# Patient Record
Sex: FEMALE | Race: WHITE
[De-identification: names, ages, dates, MRNs, and addresses within clinical notes are randomized per-mention and may not be internally consistent; named-entity substitution may affect disease eponyms.]

---

## 2018-04-19 ENCOUNTER — HOSPITAL ENCOUNTER (EMERGENCY)
Dept: HOSPITAL 25 - UCCORT | Age: 63
Discharge: HOME | End: 2018-04-19
Payer: COMMERCIAL

## 2018-04-19 VITALS — DIASTOLIC BLOOD PRESSURE: 63 MMHG | SYSTOLIC BLOOD PRESSURE: 145 MMHG

## 2018-04-19 DIAGNOSIS — J32.9: Primary | ICD-10-CM

## 2018-04-19 DIAGNOSIS — R42: ICD-10-CM

## 2018-04-19 PROCEDURE — 99202 OFFICE O/P NEW SF 15 MIN: CPT

## 2018-04-19 PROCEDURE — G0463 HOSPITAL OUTPT CLINIC VISIT: HCPCS

## 2018-04-19 NOTE — UC
Throat Pain/Nasal Andrea HPI





- HPI Summary


HPI Summary: 





sinus pain and pressure x 2 weeks


+ nasal congestion , pnd, no cough , no fever


+ dizziness started this morning , worse with head movement 


no nausea , no vomiting, no headache 





- History of Current Complaint


Chief Complaint: UCEar


Stated Complaint: SINUSES,EARS


Time Seen by Provider: 18 12:55


Hx Obtained From: Patient


Onset/Duration: Gradual Onset, Lasting Weeks - 2, Still Present


Severity: Moderate


Pain Intensity: 2


Cough: None


Associated Signs & Symptoms: Positive: Sinus Discomfort, Nasal Discharge.  

Negative: Dysphagia, FB Sensation, Drooling, Hoarseness, Fever, Vomiting, Rash





- Allergies/Home Medications


Allergies/Adverse Reactions: 


 Allergies











Allergy/AdvReac Type Severity Reaction Status Date / Time


 


No Known Allergies Allergy   Verified 18 12:50











Home Medications: 


 Home Medications





Naproxen Sodium [Aleve] 440 mg PO DAILY PRN 18 [History Confirmed 18

]











PMH/Surg Hx/FS Hx/Imm Hx


Previously Healthy: Yes





- Surgical History


Surgical History: Yes


Surgery Procedure, Year, and Place: .  choly.  ACL repair.  tonsils





- Family History


Known Family History: 


   Negative: Diabetes





- Social History


Alcohol Use: Occasionally


Substance Use Type: None


Smoking Status (MU): Never Smoked Tobacco





Review of Systems


Constitutional: Negative


Skin: Negative


Eyes: Negative


ENT: Ear Ache, Nasal Discharge, Sinus Congestion, Sinus Pain/Tenderness


Respiratory: Negative


Cardiovascular: Negative


Gastrointestinal: Negative


Neurological: Negative


Is Patient Immunocompromised?: No


All Other Systems Reviewed And Are Negative: Yes





Physical Exam


Triage Information Reviewed: Yes


Appearance: Well-Appearing, No Pain Distress, Well-Nourished


Vital Signs: 


 Initial Vital Signs











Temp  98.4 F   18 12:41


 


Pulse  73   18 12:41


 


Resp  18   18 12:41


 


BP  145/63   18 12:41


 


Pulse Ox  99   18 12:41











Vital Signs Reviewed: Yes


Eyes: Positive: Conjunctiva Clear


ENT: Positive: Normal ENT inspection, Hearing grossly normal, Pharynx normal, 

Nasal congestion, TMs normal.  Negative: TM bulging, TM dull, TM red


Neck exam: Normal


Neck: Positive: Supple, Nontender, No Lymphadenopathy


Respiratory: Positive: Chest non-tender, Lungs clear, Normal breath sounds, No 

respiratory distress


Cardiovascular: Positive: RRR, No Murmur, Pulses Normal


Musculoskeletal Exam: Normal


Neurological Exam: Normal


Neurological: Positive: Alert, Muscle Tone Normal.  Negative: Fatigued, 

Lethargic, Unresponsive, Abnormal Muscle Tone


Skin Exam: Normal





Throat Pain/Nasal Course/Dx





- Differential Dx/Diagnosis


Provider Diagnoses: sinusitis.  vertigo





Discharge





- Sign-Out/Discharge


Documenting (check all that apply): Discharge





- Discharge Plan


Condition: Stable


Disposition: HOME


Prescriptions: 


Amoxicillin/Clavulanate TAB* [Augmentin *] 875 mg PO BID #20 tab


Fluticasone NASAL SPRAY 50MCG* [Flonase NASAL SPRAY 50MCG*] 2 spray BOTH NARES 

DAILY #1 btl


Meclizine TAB* [Antivert 12.5 TAB*] 25 mg PO TID PRN #15 tab


 PRN Reason: Vertigo


Patient Education Materials:  Sinusitis (ED), Vertigo (DC)


Referrals: 


Dior Perez MD [Primary Care Provider] - 7 Days





- Billing Disposition and Condition


Condition: STABLE


Disposition: HOME

## 2019-01-17 ENCOUNTER — HOSPITAL ENCOUNTER (EMERGENCY)
Dept: HOSPITAL 25 - UCCORT | Age: 64
Discharge: HOME | End: 2019-01-17
Payer: COMMERCIAL

## 2019-01-17 VITALS — DIASTOLIC BLOOD PRESSURE: 77 MMHG | SYSTOLIC BLOOD PRESSURE: 139 MMHG

## 2019-01-17 DIAGNOSIS — J09.X2: Primary | ICD-10-CM

## 2019-01-17 PROCEDURE — 99212 OFFICE O/P EST SF 10 MIN: CPT

## 2019-01-17 PROCEDURE — G0463 HOSPITAL OUTPT CLINIC VISIT: HCPCS

## 2019-01-17 NOTE — UC
FLU HPI





- HPI Summary


HPI Summary: 





62 y/o female presents to the urgent care c/o headache, fatigue, body aches, 

clear nasal discharge, decrease appetite x 3 days. Pain is 4/10. Pt has been 

taking Tylenol PO to alleviate symptoms. He has a lot of PND and low grade 

fever at home which has been resolving. Pt denies SOB, chest pain,abdominal pain

, chest pain, N/V/D. 








- History of Current Complaint


Chief Complaint: UCGeneralIllness


Stated Complaint: FATIGUE, HEADACHE, APPETITE LOSS


Time Seen by Provider: 19 16:18


Hx Obtained From: Patient


Onset/Duration: Gradual Onset, Lasting Days - 3 days, Still Present, Worse 

Since - today


Severity Currently: Mild


Severity Initially: Mild


Pain Intensity: 4 - Headache


Pain Scale Used: 0-10 Numeric


Associated Signs & Symptoms: Positive: Fever - subjective fever at home, Myalgia

, Cough - dry, Nasal Congestion - clear, Headache





- Risk Factors


Influenza Risk Factors: Negative





- Allergy/Home Medications


Allergies/Adverse Reactions: 


 Allergies











Allergy/AdvReac Type Severity Reaction Status Date / Time


 


No Known Allergies Allergy   Verified 19 15:54











Home Medications: 


 Home Medications





Acetaminophen TAB* [Tylenol TAB*] 325 mg PO Q4H PRN 19 [History Confirmed 

19]











PMH/Surg Hx/FS Hx/Imm Hx


Previously Healthy: Yes - Pt denies PMHX





- Surgical History


Surgical History: Yes


Surgery Procedure, Year, and Place:  x2.  choly.  ACL repair.  tonsils





- Family History


Known Family History: Positive: Diabetes





- Social History


Occupation: Employed Full-time


Lives: With Family


Alcohol Use: Occasionally


Substance Use Type: None


Smoking Status (MU): Never Smoked Tobacco





Review of Systems


All Other Systems Reviewed And Are Negative: Yes


Constitutional: Positive: Fatigue, Other - body aches


Skin: Positive: Negative


Eyes: Positive: Negative


ENT: Positive: Nasal Discharge, Sinus Congestion


Respiratory: Positive: Cough - dry


Cardiovascular: Positive: Negative


Gastrointestinal: Positive: Negative


Genitourinary: Positive: Negative


Motor: Positive: Negative


Neurovascular: Positive: Negative


Musculoskeletal: Positive: Myalgia


Neurological: Positive: Headache


Psychological: Positive: Negative


Is Patient Immunocompromised?: No





Physical Exam





- Summary


Physical Exam Summary: 





VITAL SIGNS: Reviewed.


GENERAL: Patient is a well developed and nourished female who is sitting 

comfortable in the examining table. Patient is not in any acute respiratory 

distress.


HEAD AND FACE: No signs of trauma. No ecchymosis, hematomas or skull 

depressions. No sinus tenderness.


EYES: PERRLA, EOMI x 2, No injected conjunctiva, no nystagmus. No photophobia.


EARS: Hearing grossly intact. Ear canals and tympanic membranes are within 

normal limits.


Nose: edematous and erythematous nasal mucosa w/ clear nasal discharge.


MOUTH: Positive no erythema, no tonsillar enlargement. Uvula in midline.


NECK: Supple, trachea is midline, Positive anterior cervical lymphadenopathy, 

no JVD, no carotid bruit, no c-spine tenderness, neck with full ROM. No 

meningeal signs, no Kernig's or brudzinskis signs.


CHEST: Symmetric, no tenderness at palpation


LUNGS: Clear to auscultation bilaterally. No wheezing or crackles.


CVS: Regular rate and rhythm, S1 and S2 present, no murmurs or gallops 

appreciated.


ABDOMEN: Soft, non-tender. No signs of distention. No rebound no guarding, and 

no masses palpated. Bowel sounds are normal.


EXTREMITIES: FROM in all major joints, no edema, no cyanosis or clubbing.


NEURO: Alert and oriented x 3. No acute neurological deficits. Speech is normal 

and follows commands.


SKIN: Dry and warm





Triage Information Reviewed: Yes


Vital Signs: 


 Initial Vital Signs











Temp  99.5 F   19 15:55


 


Pulse  88   19 15:55


 


Resp  15   19 15:55


 


BP  139/77   19 15:55


 


Pulse Ox  98   19 15:55














Flu Course/Dx





- Course


Course Of Treatment: 62 y/o female presents to the urgent care c/o headache, 

fatigue, body aches, clear nasal discharge, decrease appetite x 3 days. Pain is 

4/10. Pt has been taking Tylenol PO to alleviate symptoms. He has a lot of PND 

and low grade fever at home which has been resolving. Pt denies SOB, chest pain,

abdominal pain, chest pain, N/V/D. Hx obtained. Pt with URI on examination. 

Influenza A&B ordered: result: Influenza A positive.Pt Rx Tamiflu and ibuprofen 

PO to alleviates symptoms. Advised on hand washing and wear a mask to avoid 

spreading. Pt advised to rest, increase fluid intake, eat well and avoid 

strenuous exercise. If symptoms do not improve or worsen advised to return to 

the urgent care or f/u with her PCP for further evaluation and treatment. Pt 

understood and agreed with plan of care.





- Differential Dx/Diagnosis


Differential Diagnosis/HQI/PQRI: Bronchitis, Influenza, Upper Respiratory 

Infection


Provider Diagnosis: 


 Influenza A








Discharge





- Sign-Out/Discharge


Documenting (check all that apply): Patient Departure - D/C home


All imaging exams completed and their final reports reviewed: No Studies





- Discharge Plan


Condition: Stable


Disposition: HOME


Prescriptions: 


Oseltamivir CAP* [Tamiflu CAP*] 75 mg PO BID #10 cap


Patient Education Materials:  Influenza (ED)


Forms:  *Work Release


Referrals: 


Dior Perez MD [Primary Care Provider] - 3 Days


Additional Instructions: 


1- Please take the full course of the antiviral  to avoid resistance. Encourage 

hand washing and wear a mask to avoid spreading.


2-Please continue taking Tylenol PO q6-8hrs prn as instructed after meals to 

alleviate  symptoms. Increase fluid intake, eat well, rest and avoid strenuous 

exercise


3-If symptoms do not improve or worsen please return to the urgent care or f/u 

with your PCP in 2 days for further evaluation and treatment.








- Billing Disposition and Condition


Condition: STABLE


Disposition: Home





- Attestation Statements


Provider Attestation: 





I was available for consult. This patient was seen by the JOSE. The patient was 

not presented to, seen by, or examined by me. -Stephanie

## 2019-03-07 ENCOUNTER — HOSPITAL ENCOUNTER (EMERGENCY)
Dept: HOSPITAL 25 - UCCORT | Age: 64
Discharge: HOME | End: 2019-03-07
Payer: COMMERCIAL

## 2019-03-07 VITALS — DIASTOLIC BLOOD PRESSURE: 58 MMHG | SYSTOLIC BLOOD PRESSURE: 124 MMHG
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
## 2019-03-07 DIAGNOSIS — S22.42XA: Primary | ICD-10-CM

## 2019-03-07 PROCEDURE — G0463 HOSPITAL OUTPT CLINIC VISIT: HCPCS

## 2019-03-07 PROCEDURE — 93005 ELECTROCARDIOGRAM TRACING: CPT

## 2019-03-07 PROCEDURE — 99211 OFF/OP EST MAY X REQ PHY/QHP: CPT

## 2019-03-07 NOTE — UC
Minor Trauma HPI





- HPI Summary


HPI Summary: 





64 yo female slipped on ice a couple of days ago


landed flat on back


min back pain but has left side chest pain with deep breath/laying flat or 

turning in bed











no head injury or neck pain











- History of Current Complaint


Chief Complaint: UCChestPain


Stated Complaint: S/P FALL(3/4/19)-PAIN/BURNING LEFT SIDE


Time Seen by Provider: 19 08:25


Hx Obtained From: Patient


Onset/Duration: Sudden Onset, Still Present


Onset Of Pain: Immediate


Severity Initially: Moderate


Severity Currently: Moderate


Pain Intensity: 5


Pain Scale Used: 0-10 Numeric


Mechanism Of Injury: Fall From A Standing Position


Aggravating Factor(s): Coughing, Deep Breaths, Movement


Alleviating Factor(s): Rest


Associated Signs And Symptoms: Negative: Loss Of Consciousness, Ecchymosis, 

Swelling


Related History: Positive: Similar Episode


Body - Head: 


  __________________________














  __________________________





 1 - pain





 2 - radiation of pain








- Allergies/Home Medications


Allergies/Adverse Reactions: 


 Allergies











Allergy/AdvReac Type Severity Reaction Status Date / Time


 


No Known Allergies Allergy   Verified 19 08:20











Home Medications: 


 Home Medications





Ibuprofen TAB* [Motrin TAB* 400 MG] 400 mg PO DAILY PRN 19 [History 

Confirmed 19]


Naproxen Sodium [Aleve] 440 mg PO ONCE PRN 19 [History Confirmed 19]











PMH/Surg Hx/FS Hx/Imm Hx


Previously Healthy: Yes





- Surgical History


Surgical History: Yes


Surgery Procedure, Year, and Place:  x2.  choly.  left ACL repair.  

tonsils





- Family History


Known Family History: Positive: Hypertension, Diabetes





- Social History


Alcohol Use: Occasionally


Substance Use Type: None


Smoking Status (MU): Never Smoked Tobacco





Review of Systems


All Other Systems Reviewed And Are Negative: Yes


Constitutional: Positive: Negative


Skin: Positive: Negative


Eyes: Positive: Negative


ENT: Positive: Negative


Respiratory: Positive: Negative


Cardiovascular: Positive: Chest Pain


Gastrointestinal: Positive: Negative


Genitourinary: Positive: Negative


Motor: Positive: Negative


Neurovascular: Positive: Negative


Musculoskeletal: Positive: Negative


Neurological: Positive: Negative


Psychological: Positive: Negative





Physical Exam


Triage Information Reviewed: Yes


Appearance: Well-Appearing, No Pain Distress, Well-Nourished


Vital Signs: 


 Initial Vital Signs











Temp  97.6 F   19 08:11


 


Pulse  84   19 08:11


 


Resp  20   19 08:11


 


BP  124/58   19 08:11


 


Pulse Ox  98   19 08:11











Vital Signs Reviewed: Yes


Eyes: Positive: Conjunctiva Clear


ENT: Positive: Normal ENT inspection.  Negative: Nasal congestion, Nasal 

drainage, Trismus, Muffled voice, Hoarse voice


Neck: Positive: Supple, Nontender, No Lymphadenopathy


Respiratory: Positive: Lungs clear, Normal breath sounds, No respiratory 

distress, No accessory muscle use, Other: - no sternal tenderness or step offs, 

tender left sternal border, + left sided CP with rib spring.  Negative: Chest 

non-tender


Cardiovascular: Positive: RRR, No Murmur.  Negative: Tachycardia, Bradycardia


Abdomen Description: Positive: Nontender, No Organomegaly, Soft.  Negative: CVA 

Tenderness (R), CVA Tenderness (L)


Bowel Sounds: Positive: Present


Musculoskeletal: Positive: No Edema


Neurological: Positive: Alert


Psychological Exam: Normal


Skin Exam: Normal





Diagnostics





- Radiology


  ** No standard instances


Radiology Interpretation Completed By: Radiologist


Summary of Radiographic Findings: Nondisplaced rib fractures of the 

anterolateral left seventh and eighth ribs





Minor Trauma Course/Dx





- Differential Dx/Diagnosis


Provider Diagnosis: 


 Left rib fracture








Discharge





- Sign-Out/Discharge


Documenting (check all that apply): Patient Departure


All imaging exams completed and their final reports reviewed: Yes





- Discharge Plan


Condition: Stable


Disposition: HOME


Patient Education Materials:  Rib Fracture (ED)


Referrals: 


Dior Perez MD [Primary Care Provider] - 2 Weeks


Additional Instructions: 


you have nondisplaced rib fracture of the left 7th and 8th ribs





- Billing Disposition and Condition


Condition: STABLE


Disposition: Home